# Patient Record
Sex: FEMALE | Race: WHITE
[De-identification: names, ages, dates, MRNs, and addresses within clinical notes are randomized per-mention and may not be internally consistent; named-entity substitution may affect disease eponyms.]

---

## 2020-03-16 ENCOUNTER — HOSPITAL ENCOUNTER (OUTPATIENT)
Dept: HOSPITAL 75 - RAD FS | Age: 55
End: 2020-03-16
Attending: NURSE PRACTITIONER
Payer: COMMERCIAL

## 2020-03-16 DIAGNOSIS — M79.671: ICD-10-CM
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
## 2020-03-16 DIAGNOSIS — S99.921A: Primary | ICD-10-CM

## 2020-03-16 PROCEDURE — 73630 X-RAY EXAM OF FOOT: CPT

## 2020-03-16 NOTE — DIAGNOSTIC IMAGING REPORT
EXAMINATION: Right foot radiographs, 3 views.



COMPARISON: None. 



HISTORY: 54-year-old female, right foot pain. Injury. 



FINDINGS: There is a calcaneal heel spur. There is an os

navicularis. There is no identified acute fracture. Bone

mineralization and alignment are unremarkable. The joint spaces

are well preserved. 



IMPRESSION: 

1. No identified acute bony abnormality of the right foot. 



 



Dictated by: 



  Dictated on workstation # [id number]